# Patient Record
Sex: FEMALE | Race: WHITE | URBAN - METROPOLITAN AREA
[De-identification: names, ages, dates, MRNs, and addresses within clinical notes are randomized per-mention and may not be internally consistent; named-entity substitution may affect disease eponyms.]

---

## 2017-09-06 ENCOUNTER — TELEPHONE (OUTPATIENT)
Dept: ORTHOPEDIC SURGERY | Age: 72
End: 2017-09-06

## 2017-09-14 ENCOUNTER — OFFICE VISIT (OUTPATIENT)
Dept: ORTHOPEDIC SURGERY | Age: 72
End: 2017-09-14

## 2017-09-14 VITALS — RESPIRATION RATE: 15 BRPM | BODY MASS INDEX: 24.66 KG/M2 | HEIGHT: 65 IN | WEIGHT: 148 LBS

## 2017-09-14 DIAGNOSIS — S92.422B OPEN DISPLACED FRACTURE OF DISTAL PHALANX OF LEFT GREAT TOE, INITIAL ENCOUNTER: Primary | ICD-10-CM

## 2017-09-14 PROCEDURE — 73650 X-RAY EXAM OF HEEL: CPT | Performed by: ORTHOPAEDIC SURGERY

## 2017-09-14 PROCEDURE — 99024 POSTOP FOLLOW-UP VISIT: CPT | Performed by: ORTHOPAEDIC SURGERY

## 2017-09-14 RX ORDER — OXYCODONE HYDROCHLORIDE AND ACETAMINOPHEN 5; 325 MG/1; MG/1
1 TABLET ORAL EVERY 4 HOURS PRN
Qty: 42 TABLET | Refills: 0 | Status: SHIPPED | OUTPATIENT
Start: 2017-09-14 | End: 2017-10-13 | Stop reason: SDUPTHER

## 2017-09-28 ENCOUNTER — TELEPHONE (OUTPATIENT)
Dept: ORTHOPEDIC SURGERY | Age: 72
End: 2017-09-28

## 2017-10-12 ENCOUNTER — OFFICE VISIT (OUTPATIENT)
Dept: ORTHOPEDIC SURGERY | Age: 72
End: 2017-10-12

## 2017-10-12 VITALS — RESPIRATION RATE: 16 BRPM | HEIGHT: 65 IN | WEIGHT: 148 LBS | BODY MASS INDEX: 24.66 KG/M2

## 2017-10-12 DIAGNOSIS — S92.422B OPEN DISPLACED FRACTURE OF DISTAL PHALANX OF LEFT GREAT TOE, INITIAL ENCOUNTER: Primary | ICD-10-CM

## 2017-10-12 PROCEDURE — 73630 X-RAY EXAM OF FOOT: CPT | Performed by: ORTHOPAEDIC SURGERY

## 2017-10-12 PROCEDURE — 99024 POSTOP FOLLOW-UP VISIT: CPT | Performed by: ORTHOPAEDIC SURGERY

## 2017-10-12 RX ORDER — OXYCODONE HYDROCHLORIDE AND ACETAMINOPHEN 5; 325 MG/1; MG/1
1 TABLET ORAL EVERY 6 HOURS PRN
Qty: 28 TABLET | Refills: 0 | Status: CANCELLED | OUTPATIENT
Start: 2017-10-12

## 2017-10-12 NOTE — LETTER
 Cholecalciferol (VITAMIN D3) 5000 units TABS Take 5,000 Units by mouth daily      rOPINIRole (REQUIP) 1 MG tablet Take 1 mg by mouth nightly       No current facility-administered medications on file prior to visit. Pertinent items are noted in HPI  Review of systems reviewed from Patient History Form dated on 9/14/2017 and available in the patient's chart under the Media tab. PHYSICAL EXAMINATION:  Ms. Mya Segundo is a very pleasant 71 y.o.  female who presents today in no acute distress, awake, alert, and oriented. She is well dressed, nourished and  groomed. Patient with normal affect. Height is  5' 5\" (1.651 m), weight is 148 lb (67.1 kg), Body mass index is 24.63 kg/m². Resting respiratory rate is 16. The patient walks with no limp. The incision/traumatic wound are healing nicely left great toe. Sutures removed and steri strips applied today.  Minimal erythema, no drainage.  She  has minimal to no swelling. No tenderness and no pain with ankle ROM. She has intact sensation in the left foot. IMAGING:    Three views of the left foot taken today in the office were reviewed.  These demonstrate that the great distal phalanx fracture in good position.          IMPRESSION:  4 weeks out from left great toe distal phalanx open fracture, I&D and open treatment, and doing well. PLAN:  She will weightbearing in post op shoe.  She will come back in 6 weeks.  At that time, we will  get x-rays, 3 views of the left foot standing. Percocet for postop pain. [place], [place]   If you have questions, please do not hesitate to call me. I look forward to following Deborah Cancel along with you.     Sincerely,        [place], MD    [facility] providers:  Renata Arteaga MD  [address] [facility],[address]  [address]: [phone]

## 2017-10-13 RX ORDER — OXYCODONE HYDROCHLORIDE AND ACETAMINOPHEN 5; 325 MG/1; MG/1
1 TABLET ORAL EVERY 6 HOURS PRN
Qty: 28 TABLET | Refills: 0 | Status: SHIPPED | OUTPATIENT
Start: 2017-10-13 | End: 2017-10-20

## 2017-10-13 NOTE — PROGRESS NOTES
female who presents today in no acute distress, awake, alert, and oriented. She is well dressed, nourished and  groomed. Patient with normal affect. Height is  5' 5\" (1.651 m), weight is 148 lb (67.1 kg), Body mass index is 24.63 kg/m². Resting respiratory rate is 16. The patient walks with no limp. The incision/traumatic wound are healing nicely left great toe. Sutures removed and steri strips applied today.  Minimal erythema, no drainage.  She  has minimal to no swelling. No tenderness and no pain with ankle ROM. She has intact sensation in the left foot. IMAGING:    Three views of the left foot taken today in the office were reviewed.  These demonstrate that the great distal phalanx fracture in good position.          IMPRESSION:  4 weeks out from left great toe distal phalanx open fracture, I&D and open treatment, and doing well. PLAN:  She will weightbearing in post op shoe.  She will come back in 6 weeks.  At that time, we will  get x-rays, 3 views of the left foot standing. Percocet for postop pain.       Gama Kramer MD

## 2017-10-26 NOTE — COMMUNICATION BODY
DIAGNOSIS: Left great toe distal phalanx open fracture, I&D and open treatment.       DATE OF SURGERY:  9/6/2017. HISTORY OF PRESENT ILLNESS:     Ms. So Baca 71 y.o.  female 4 weeks out from her surgery.  She has been weightbearing in a post-op shoe.  She denies any sharp pain. Rates pain a 7/10 VAS moderate, throbbing, tender, intermittent and are improving. Pain is worse with walking and better with rest. Alleviating factors elevation and rest. No fever or chills.       Past Medical History:   Diagnosis Date    ADHD (attention deficit hyperactivity disorder)     GERD (gastroesophageal reflux disease)     Bleeding Stomach Ulcer 2 yrs ago       Past Surgical History:   Procedure Laterality Date    JOINT REPLACEMENT      L knee    TOE SURGERY Left 09/06/2017    Left great toe I and D       Social History     Social History    Marital status: Single     Spouse name: N/A    Number of children: N/A    Years of education: N/A     Occupational History    Not on file. Social History Main Topics    Smoking status: Never Smoker    Smokeless tobacco: Not on file    Alcohol use Yes      Comment: occasioanly    Drug use: No    Sexual activity: Not on file     Other Topics Concern    Not on file     Social History Narrative    No narrative on file       No family history on file. Current Outpatient Prescriptions on File Prior to Visit   Medication Sig Dispense Refill    amitriptyline (ELAVIL) 100 MG tablet Take 300 mg by mouth nightly      Cholecalciferol (VITAMIN D3) 5000 units TABS Take 5,000 Units by mouth daily      rOPINIRole (REQUIP) 1 MG tablet Take 1 mg by mouth nightly       No current facility-administered medications on file prior to visit. Pertinent items are noted in HPI  Review of systems reviewed from Patient History Form dated on 9/14/2017 and available in the patient's chart under the Media tab. PHYSICAL EXAMINATION:  Ms. So Baca is a very pleasant 71 y.o.   female who presents today in no acute distress, awake, alert, and oriented. She is well dressed, nourished and  groomed. Patient with normal affect. Height is  5' 5\" (1.651 m), weight is 148 lb (67.1 kg), Body mass index is 24.63 kg/m². Resting respiratory rate is 16. The patient walks with no limp. The incision/traumatic wound are healing nicely left great toe. Sutures removed and steri strips applied today.  Minimal erythema, no drainage.  She  has minimal to no swelling. No tenderness and no pain with ankle ROM. She has intact sensation in the left foot. IMAGING:    Three views of the left foot taken today in the office were reviewed.  These demonstrate that the great distal phalanx fracture in good position.          IMPRESSION:  4 weeks out from left great toe distal phalanx open fracture, I&D and open treatment, and doing well. PLAN:  She will weightbearing in post op shoe.  She will come back in 6 weeks.  At that time, we will  get x-rays, 3 views of the left foot standing. Percocet for postop pain.       Vginesh Power MD